# Patient Record
(demographics unavailable — no encounter records)

---

## 2024-10-08 NOTE — HISTORY OF PRESENT ILLNESS
[TextBox_4] : Patient is here for follow-up visit.  85-year-old male with a history of parotid squamous cell carcinoma, post parotidectomy and radiation Also being followed for a right upper lobe nodule  Patient's last CAT scan was in June, which continued to show the right upper lobe peripheral opacity but was unchanged compared with July 2023  Patient is feeling well.  He has a good appetite.  No weight loss.  No shortness of breath and chest pain.  He does have an occasional cough, still lingering since a viral infection last year.  Improves when he takes a as needed benzonatate .

## 2024-10-08 NOTE — DISCUSSION/SUMMARY
[FreeTextEntry1] : 85-year-old male, former smoker, history of parotid squamous cell cancer status post surgery and radiation. Nonspecific peripheral right upper lobe 1.5 cm nodule, last CT from June 2024 showed no change when compared with July 2023.  Continued follow-up.  I have ordered a follow-up CT chest without contrast.  This can be done in November/December.  He is seeing ENT next week.  If there will be a CT neck ordered, he can do them at the same time.  Otherwise he can do the chest on his own.  Will call with results.  already received a flu shot

## 2024-10-15 NOTE — REASON FOR VISIT
Pt  Lee Johnson because her issue that she was to be seen for has been resolved by the pulmonary doctor  Plus she is having trouble getting a ride to the appt  [Subsequent Evaluation] : a subsequent evaluation for [FreeTextEntry2] : Squamous cell skin cancer and parotid mass

## 2024-10-15 NOTE — HISTORY OF PRESENT ILLNESS
[de-identified] : 85 year old male presents for follow-up. Pt is s/p right parotidectomy and neck dissection with graft on 12/7/2019. Path showed squamous cell carcinoma. mod poorly differential with sarcomatoid squamous cell. Neg lymph nodes. Patient completed RT on 3/9/2020. Pt also had excision of posterior neck lipoma 10/5/20. path was benign.  Ct neck/chest done 7/5/23.  CT chest had some findings and repeat 3-month CT chest was on done 10/16/23 then again on 1/16/2024. New CT neck/chest done June 2024.  Pt had flu shot on 9/28/24, and had sore throat after that which is better, but now he has a cough with occasional mucus, and runny nose.  Today pt denies neck pain or throat pain. No facial swelling or pain. No dysphagia, dyspnea, choking, dysphonia or hemoptysis. No recent fever, chills, or weight loss. Eating and drinking without issues.

## 2024-10-15 NOTE — CONSULT LETTER
[Dear  ___] : Dear  [unfilled], [Courtesy Letter:] : I had the pleasure of seeing your patient, [unfilled], in my office today. [Please see my note below.] : Please see my note below. [Consult Closing:] : Thank you very much for allowing me to participate in the care of this patient.  If you have any questions, please do not hesitate to contact me. [Sincerely,] : Sincerely, [FreeTextEntry2] : Dr Kim Sue  [FreeTextEntry3] : \par  Dilan Weber MD, FACS\par  \par  Otolaryngology-Head and Neck Surgery\par  [employer] at [facility]\par

## 2024-10-15 NOTE — PHYSICAL EXAM
[Midline] : trachea located in midline position [Normal] : no rashes [de-identified] : Flap viable. RADHA. R retroauricular incision well healed. Presence of R posterior neck fullness suggestive of a second lipoma of the R neck.

## 2024-10-18 NOTE — HISTORY OF PRESENT ILLNESS
[FreeTextEntry1] : Mr. 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    He experienced mild ear pain and mild skin reaction during treatment.  PET/CT and MRI from 6/2020 did not show signs of recurrence.  h/o resection of posterior neck lipoma.   MRI in 2/2021 IMPRESSION: No evidence of neoplastic recurrence and to the right parotidectomy post surgical site. No cervical lymphadenopathy. Interval resection of a right-sided suboccipital lipoma. Unchanged appearing right upper neck and right upper back subcutaneous lipomas.  9/2021 -- MRI Neck showed RADHA.  7/2022 -- MRI showed RADHA. 7/2023 -- CT scan showed IMPRESSION: Right upper lobe peripheral linear opacity with mild interval increase in conspicuity or thickness since June 9, 2020 may represent a focus of linear or subsegmental atelectasis. A 3 month follow-up noncontrast chest CT is recommended for complete evaluation.  Neck CT showed IMPRESSION: No evidence of recurrent or metastatic disease within the neck. [place] nodular density within the right upper lobe laterally not seen on the prior exam. Please refer to contemporaneously performed chest CT. For complete evaluation of the neck, please refer to dedicated CT performed on the same day. 6/2024 -- CT scan showed IMPRESSION: Stable interval follow-up CT examination without evidence of right-sided parotid neoplastic disease progression nor recurrence. No new or enlarging cervical lymph nodes. Right-sided posterolateral neck lipoma, as discussed.  10/15/2024 f/u. Pt completed 60 Gy / 30 Fx of radiation to right parotid area in 3/2020. c/o taste changes at times. Denies dysphagia, neck pain, dry mouth.

## 2024-10-18 NOTE — REVIEW OF SYSTEMS
[Negative] : Allergic/Immunologic [Dysphagia: Grade 0] : Dysphagia: Grade 0 [Tinnitus - Grade 0] : Tinnitus - Grade 0 [Blurred Vision: Grade 0] : Blurred Vision: Grade 0 [Mucositis Oral: Grade 0] : Mucositis Oral: Grade 0  [Xerostomia: Grade 0] : Xerostomia: Grade 0 [Oral Pain: Grade 0] : Oral Pain: Grade 0 [Salivary duct inflammation: Grade 0] : Salivary duct inflammation: Grade 0 [Dysgeusia: Grade 1- Altered taste but no change in diet] : Dysgeusia: Grade 1 - Altered taste but no change in diet [Hoarseness: Grade 0] : Hoarseness: Grade 0 [Voice Alteration: Grade 0] : Voice Alteration: Grade 0 [Alopecia: Grade 0] : Alopecia: Grade 0 [Pruritus: Grade 0] : Pruritus: Grade 0 [Skin Atrophy: Grade 0] : Skin Atrophy: Grade 0 [Skin Hyperpigmentation: Grade 0] : Skin Hyperpigmentation: Grade 0 [Skin Induration: Grade 0] : Skin Induration: Grade 0 [Dermatitis Radiation: Grade 0] : Dermatitis Radiation: Grade 0 [FreeTextEntry4] : h/o radiation in 3/2020

## 2024-10-18 NOTE — HISTORY OF PRESENT ILLNESS
[FreeTextEntry1] : Mr. 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    He experienced mild ear pain and mild skin reaction during treatment.  PET/CT and MRI from 6/2020 did not show signs of recurrence.  h/o resection of posterior neck lipoma.   MRI in 2/2021 IMPRESSION: No evidence of neoplastic recurrence and to the right parotidectomy post surgical site. No cervical lymphadenopathy. Interval resection of a right-sided suboccipital lipoma. Unchanged appearing right upper neck and right upper back subcutaneous lipomas.  9/2021 -- MRI Neck showed RADHA.  7/2022 -- MRI showed RADHA. 7/2023 -- CT scan showed IMPRESSION: Right upper lobe peripheral linear opacity with mild interval increase in conspicuity or thickness since June 9, 2020 may represent a focus of linear or subsegmental atelectasis. A 3 month follow-up noncontrast chest CT is recommended for complete evaluation.  Neck CT showed IMPRESSION: No evidence of recurrent or metastatic disease within the neck. [place] nodular density within the right upper lobe laterally not seen on the prior exam. Please refer to contemporaneously performed chest CT. For complete evaluation of the neck, please refer to dedicated CT performed on the same day. 6/2024 -- CT scan showed IMPRESSION: Stable interval follow-up CT examination without evidence of right-sided parotid neoplastic disease progression nor recurrence. No new or enlarging cervical lymph nodes. Right-sided posterolateral neck lipoma, as discussed.  10/15/2024 f/u. Pt completed 60 Gy / 30 Fx of radiation to right parotid area in 3/2020. c/o taste changes at times. Denies dysphagia, neck pain, dry mouth.

## 2024-11-19 NOTE — PHYSICAL EXAM
[de-identified] : Flap viable. RADHA. R retroauricular incision well healed. Presence of R posterior neck fullness suggestive of a second lipoma of the R neck.

## 2024-11-19 NOTE — CONSULT LETTER
[FreeTextEntry2] : Dr Kim Sue  [FreeTextEntry3] : \par  Dilan Weber MD, FACS\par  \par  Otolaryngology-Head and Neck Surgery\par  [employer] at [facility]\par

## 2024-11-19 NOTE — CONSULT LETTER
[FreeTextEntry2] : Dr Kim Sue  [FreeTextEntry3] : \par  Dilan Weber MD, FACS\par  \par  Otolaryngology-Head and Neck Surgery\par  [employer] at [facility]\par

## 2024-11-19 NOTE — PHYSICAL EXAM
[de-identified] : Flap viable. RADHA. R retroauricular incision well healed. Presence of R posterior neck fullness suggestive of a second lipoma of the R neck.

## 2024-11-19 NOTE — HISTORY OF PRESENT ILLNESS
[de-identified] : 85 year old male presents for follow-up. Pt is s/p right parotidectomy and neck dissection with graft on 12/7/2019. Path showed squamous cell carcinoma. mod poorly differential with sarcomatoid squamous cell. Neg lymph nodes. Patient completed RT on 3/9/2020. Pt also had excision of posterior neck lipoma 10/5/20. path was benign.  CT chest 7/2023 had some findings and repeat 3-month CT chest was on done 10/16/23 then again on 1/16/2024. New CT neck/chest done June 2024. Pt following up with pulmonology.  Cough and mucus are better.  Was started on nystatin for candida palate infection last visit- pt completed 10 days.  Today pt denies neck pain or throat pain. No facial swelling or pain. No dysphagia, dyspnea, choking, dysphonia or hemoptysis. No recent fever, chills, or weight loss. Eating and drinking without issues.  Complete review of systems which was performed during a previous encounter was reviewed with the patient and there are no changes except as stated in the HPI section.   Ct neck 6/4/2024  IMPRESSION: Stable interval follow-up CT examination without evidence of right-sided parotid neoplastic disease progression nor recurrence. No new or enlarging cervical lymph nodes. Right-sided posterolateral neck lipoma, as discussed.   CT chest 6/4/2024  IMPRESSION: Previously described right upper lobe peripheral opacity is unchanged since July 5, 2023. Continued follow-up is recommended.

## 2024-11-19 NOTE — HISTORY OF PRESENT ILLNESS
[de-identified] : 85 year old male presents for follow-up. Pt is s/p right parotidectomy and neck dissection with graft on 12/7/2019. Path showed squamous cell carcinoma. mod poorly differential with sarcomatoid squamous cell. Neg lymph nodes. Patient completed RT on 3/9/2020. Pt also had excision of posterior neck lipoma 10/5/20. path was benign.  CT chest 7/2023 had some findings and repeat 3-month CT chest was on done 10/16/23 then again on 1/16/2024. New CT neck/chest done June 2024. Pt following up with pulmonology.  Cough and mucus are better.  Was started on nystatin for candida palate infection last visit- pt completed 10 days.  Today pt denies neck pain or throat pain. No facial swelling or pain. No dysphagia, dyspnea, choking, dysphonia or hemoptysis. No recent fever, chills, or weight loss. Eating and drinking without issues.  Complete review of systems which was performed during a previous encounter was reviewed with the patient and there are no changes except as stated in the HPI section.   Ct neck 6/4/2024  IMPRESSION: Stable interval follow-up CT examination without evidence of right-sided parotid neoplastic disease progression nor recurrence. No new or enlarging cervical lymph nodes. Right-sided posterolateral neck lipoma, as discussed.   CT chest 6/4/2024  IMPRESSION: Previously described right upper lobe peripheral opacity is unchanged since July 5, 2023. Continued follow-up is recommended.

## 2025-02-18 NOTE — PHYSICAL EXAM
[Midline] : trachea located in midline position [Normal] : no rashes [de-identified] : Flap viable. RADHA. R retroauricular incision well healed. Presence of R posterior neck fullness suggestive of a second lipoma of the R neck.

## 2025-02-18 NOTE — REASON FOR VISIT
[Subsequent Evaluation] : a subsequent evaluation for [FreeTextEntry2] : Squamous cell skin cancer and parotid mass

## 2025-02-18 NOTE — CONSULT LETTER
[Dear  ___] : Dear  [unfilled], [Courtesy Letter:] : I had the pleasure of seeing your patient, [unfilled], in my office today. [Please see my note below.] : Please see my note below. [Consult Closing:] : Thank you very much for allowing me to participate in the care of this patient.  If you have any questions, please do not hesitate to contact me. [Sincerely,] : Sincerely, [FreeTextEntry2] : Dr Kim Sue  [FreeTextEntry3] : \par  Dilan Weber MD, FACS\par  \par  Otolaryngology-Head and Neck Surgery\par  [employer] at [facility]\par

## 2025-02-18 NOTE — HISTORY OF PRESENT ILLNESS
[de-identified] : 85 year old male presents for follow-up. Pt is s/p right parotidectomy and neck dissection with graft on 12/7/2019. Path showed squamous cell carcinoma. mod poorly differential with sarcomatoid squamous cell. Neg lymph nodes. Patient completed RT on 3/9/2020. Pt also had excision of posterior neck lipoma 10/5/20. path was benign.  CT chest 7/2023 had some findings and repeat 3-month CT chest was on done 10/16/23 then again on 1/16/2024. New CT neck/chest done June 2024. Ct chest 11/2024.  Pt following up with pulmonology.  Was started on nystatin for candida palate infection last visit- pt completed 10 days.  s/p biopsy of palate lesion on 11/19/2024 in office was benign.  Today pt with no new head/neck complaints. Denies neck pain or throat pain. No facial swelling or pain. Has not noticed new oral lesions. No dysphagia, dyspnea, choking, dysphonia or hemoptysis. No recent fever, chills, or weight loss. Eating and drinking without issues.   PATHOLOGY 11/19/2024 Addendum Report - Auth (Verified) Addendum GMS stain performed on block 1A is negative for fungus.   Surgical Pathology Report - Auth (Verified) Specimen(s) Submitted 1- Palate biopsy  Final Diagnosis 1. Oral cavity, palate, biopsy      - Oral mucosa with squamous hyperplasia, acute and chronic inflammation, and reactive cytological atypia, see comment       - GMS stain is pending and an addendum will be issued  Comment: 3 deep levels are performed on block 1A.